# Patient Record
Sex: FEMALE | Race: ASIAN | NOT HISPANIC OR LATINO
[De-identification: names, ages, dates, MRNs, and addresses within clinical notes are randomized per-mention and may not be internally consistent; named-entity substitution may affect disease eponyms.]

---

## 2020-10-27 ENCOUNTER — APPOINTMENT (OUTPATIENT)
Dept: ANTEPARTUM | Facility: CLINIC | Age: 30
End: 2020-10-27
Payer: COMMERCIAL

## 2020-10-27 PROBLEM — Z00.00 ENCOUNTER FOR PREVENTIVE HEALTH EXAMINATION: Status: ACTIVE | Noted: 2020-10-27

## 2020-10-27 PROCEDURE — 76817 TRANSVAGINAL US OBSTETRIC: CPT | Mod: 59

## 2020-10-27 PROCEDURE — 76805 OB US >/= 14 WKS SNGL FETUS: CPT

## 2020-10-27 PROCEDURE — 99072 ADDL SUPL MATRL&STAF TM PHE: CPT

## 2020-11-24 ENCOUNTER — APPOINTMENT (OUTPATIENT)
Dept: ANTEPARTUM | Facility: CLINIC | Age: 30
End: 2020-11-24
Payer: COMMERCIAL

## 2020-11-24 PROCEDURE — 76817 TRANSVAGINAL US OBSTETRIC: CPT | Mod: 59

## 2020-11-24 PROCEDURE — 76805 OB US >/= 14 WKS SNGL FETUS: CPT

## 2021-03-30 ENCOUNTER — ASOB RESULT (OUTPATIENT)
Age: 31
End: 2021-03-30

## 2021-03-30 ENCOUNTER — APPOINTMENT (OUTPATIENT)
Dept: ANTEPARTUM | Facility: CLINIC | Age: 31
End: 2021-03-30
Payer: COMMERCIAL

## 2021-03-30 PROCEDURE — 76819 FETAL BIOPHYS PROFIL W/O NST: CPT

## 2021-03-30 PROCEDURE — 99072 ADDL SUPL MATRL&STAF TM PHE: CPT

## 2021-03-30 PROCEDURE — 76816 OB US FOLLOW-UP PER FETUS: CPT

## 2021-04-04 ENCOUNTER — INPATIENT (INPATIENT)
Facility: HOSPITAL | Age: 31
LOS: 2 days | Discharge: HOME | End: 2021-04-07
Attending: OBSTETRICS & GYNECOLOGY | Admitting: OBSTETRICS & GYNECOLOGY
Payer: COMMERCIAL

## 2021-04-04 ENCOUNTER — TRANSCRIPTION ENCOUNTER (OUTPATIENT)
Age: 31
End: 2021-04-04

## 2021-04-04 DIAGNOSIS — O26.899 OTHER SPECIFIED PREGNANCY RELATED CONDITIONS, UNSPECIFIED TRIMESTER: ICD-10-CM

## 2021-04-04 DIAGNOSIS — Z3A.00 WEEKS OF GESTATION OF PREGNANCY NOT SPECIFIED: ICD-10-CM

## 2021-04-04 LAB
BASOPHILS # BLD AUTO: 0.04 K/UL — SIGNIFICANT CHANGE UP (ref 0–0.2)
BASOPHILS NFR BLD AUTO: 0.7 % — SIGNIFICANT CHANGE UP (ref 0–2)
EOSINOPHIL # BLD AUTO: 0.08 K/UL — SIGNIFICANT CHANGE UP (ref 0–0.5)
EOSINOPHIL NFR BLD AUTO: 1.4 % — SIGNIFICANT CHANGE UP (ref 0–6)
HCT VFR BLD CALC: 37.1 % — SIGNIFICANT CHANGE UP (ref 34.5–45)
HGB BLD-MCNC: 11.9 G/DL — SIGNIFICANT CHANGE UP (ref 11.5–15.5)
IMM GRANULOCYTES NFR BLD AUTO: 0.5 % — SIGNIFICANT CHANGE UP (ref 0–1.5)
LYMPHOCYTES # BLD AUTO: 1.44 K/UL — SIGNIFICANT CHANGE UP (ref 1–3.3)
LYMPHOCYTES # BLD AUTO: 24.3 % — SIGNIFICANT CHANGE UP (ref 13–44)
MCHC RBC-ENTMCNC: 29.4 PG — SIGNIFICANT CHANGE UP (ref 27–34)
MCHC RBC-ENTMCNC: 32.1 GM/DL — SIGNIFICANT CHANGE UP (ref 32–36)
MCV RBC AUTO: 91.6 FL — SIGNIFICANT CHANGE UP (ref 80–100)
MONOCYTES # BLD AUTO: 0.52 K/UL — SIGNIFICANT CHANGE UP (ref 0–0.9)
MONOCYTES NFR BLD AUTO: 8.8 % — SIGNIFICANT CHANGE UP (ref 2–14)
NEUTROPHILS # BLD AUTO: 3.81 K/UL — SIGNIFICANT CHANGE UP (ref 1.8–7.4)
NEUTROPHILS NFR BLD AUTO: 64.3 % — SIGNIFICANT CHANGE UP (ref 43–77)
NRBC # BLD: 0 /100 WBCS — SIGNIFICANT CHANGE UP (ref 0–0)
PLATELET # BLD AUTO: 230 K/UL — SIGNIFICANT CHANGE UP (ref 150–400)
RBC # BLD: 4.05 M/UL — SIGNIFICANT CHANGE UP (ref 3.8–5.2)
RBC # FLD: 13 % — SIGNIFICANT CHANGE UP (ref 10.3–14.5)
WBC # BLD: 5.92 K/UL — SIGNIFICANT CHANGE UP (ref 3.8–10.5)
WBC # FLD AUTO: 5.92 K/UL — SIGNIFICANT CHANGE UP (ref 3.8–10.5)

## 2021-04-04 RX ORDER — OXYTOCIN 10 UNIT/ML
333.33 VIAL (ML) INJECTION
Qty: 20 | Refills: 0 | Status: DISCONTINUED | OUTPATIENT
Start: 2021-04-04 | End: 2021-04-05

## 2021-04-04 RX ORDER — OXYTOCIN 10 UNIT/ML
2 VIAL (ML) INJECTION
Qty: 30 | Refills: 0 | Status: DISCONTINUED | OUTPATIENT
Start: 2021-04-04 | End: 2021-04-05

## 2021-04-04 RX ORDER — SODIUM CHLORIDE 9 MG/ML
1000 INJECTION, SOLUTION INTRAVENOUS
Refills: 0 | Status: DISCONTINUED | OUTPATIENT
Start: 2021-04-04 | End: 2021-04-05

## 2021-04-04 RX ORDER — CITRIC ACID/SODIUM CITRATE 300-500 MG
15 SOLUTION, ORAL ORAL ONCE
Refills: 0 | Status: DISCONTINUED | OUTPATIENT
Start: 2021-04-04 | End: 2021-04-05

## 2021-04-05 ENCOUNTER — RESULT REVIEW (OUTPATIENT)
Age: 31
End: 2021-04-05

## 2021-04-05 VITALS — HEIGHT: 56 IN | WEIGHT: 105.82 LBS

## 2021-04-05 LAB
ALBUMIN SERPL ELPH-MCNC: 2.9 G/DL — LOW (ref 3.3–5)
ALP SERPL-CCNC: 213 U/L — HIGH (ref 40–120)
ALT FLD-CCNC: 7 U/L — LOW (ref 10–45)
ANION GAP SERPL CALC-SCNC: 10 MMOL/L — SIGNIFICANT CHANGE UP (ref 5–17)
APTT BLD: 29.5 SEC — SIGNIFICANT CHANGE UP (ref 27.5–35.5)
AST SERPL-CCNC: 18 U/L — SIGNIFICANT CHANGE UP (ref 10–40)
BILIRUB SERPL-MCNC: 0.4 MG/DL — SIGNIFICANT CHANGE UP (ref 0.2–1.2)
BLD GP AB SCN SERPL QL: NEGATIVE — SIGNIFICANT CHANGE UP
BUN SERPL-MCNC: 8 MG/DL — SIGNIFICANT CHANGE UP (ref 7–23)
CALCIUM SERPL-MCNC: 8.6 MG/DL — SIGNIFICANT CHANGE UP (ref 8.4–10.5)
CHLORIDE SERPL-SCNC: 107 MMOL/L — SIGNIFICANT CHANGE UP (ref 96–108)
CO2 SERPL-SCNC: 23 MMOL/L — SIGNIFICANT CHANGE UP (ref 22–31)
COVID-19 SPIKE DOMAIN AB INTERP: POSITIVE
COVID-19 SPIKE DOMAIN ANTIBODY RESULT: 31.4 U/ML — HIGH
CREAT ?TM UR-MCNC: 39 MG/DL — SIGNIFICANT CHANGE UP
CREAT SERPL-MCNC: 0.73 MG/DL — SIGNIFICANT CHANGE UP (ref 0.5–1.3)
FIBRINOGEN PPP-MCNC: 391 MG/DL — SIGNIFICANT CHANGE UP (ref 258–438)
GLUCOSE SERPL-MCNC: 66 MG/DL — LOW (ref 70–99)
INR BLD: 0.87 — LOW (ref 0.88–1.16)
LDH SERPL L TO P-CCNC: 191 U/L — SIGNIFICANT CHANGE UP (ref 50–242)
POTASSIUM SERPL-MCNC: 4.1 MMOL/L — SIGNIFICANT CHANGE UP (ref 3.5–5.3)
POTASSIUM SERPL-SCNC: 4.1 MMOL/L — SIGNIFICANT CHANGE UP (ref 3.5–5.3)
PROT ?TM UR-MCNC: 9 MG/DL — SIGNIFICANT CHANGE UP (ref 0–12)
PROT SERPL-MCNC: 5.8 G/DL — LOW (ref 6–8.3)
PROT/CREAT UR-RTO: 0.2 RATIO — SIGNIFICANT CHANGE UP (ref 0–0.2)
PROTHROM AB SERPL-ACNC: 10.5 SEC — LOW (ref 10.6–13.6)
RH IG SCN BLD-IMP: POSITIVE — SIGNIFICANT CHANGE UP
RH IG SCN BLD-IMP: POSITIVE — SIGNIFICANT CHANGE UP
SARS-COV-2 IGG+IGM SERPL QL IA: 31.4 U/ML — HIGH
SARS-COV-2 IGG+IGM SERPL QL IA: POSITIVE
SARS-COV-2 RNA SPEC QL NAA+PROBE: SIGNIFICANT CHANGE UP
SODIUM SERPL-SCNC: 140 MMOL/L — SIGNIFICANT CHANGE UP (ref 135–145)
T PALLIDUM AB TITR SER: NEGATIVE — SIGNIFICANT CHANGE UP
URATE SERPL-MCNC: 4.9 MG/DL — SIGNIFICANT CHANGE UP (ref 2.5–7)

## 2021-04-05 PROCEDURE — 88307 TISSUE EXAM BY PATHOLOGIST: CPT | Mod: 26

## 2021-04-05 RX ORDER — ACETAMINOPHEN 500 MG
1000 TABLET ORAL ONCE
Refills: 0 | Status: COMPLETED | OUTPATIENT
Start: 2021-04-05 | End: 2021-04-05

## 2021-04-05 RX ORDER — KETOROLAC TROMETHAMINE 30 MG/ML
30 SYRINGE (ML) INJECTION ONCE
Refills: 0 | Status: DISCONTINUED | OUTPATIENT
Start: 2021-04-05 | End: 2021-04-05

## 2021-04-05 RX ORDER — OXYTOCIN 10 UNIT/ML
41.67 VIAL (ML) INJECTION
Qty: 20 | Refills: 0 | Status: DISCONTINUED | OUTPATIENT
Start: 2021-04-05 | End: 2021-04-07

## 2021-04-05 RX ORDER — DIPHENHYDRAMINE HCL 50 MG
25 CAPSULE ORAL EVERY 6 HOURS
Refills: 0 | Status: DISCONTINUED | OUTPATIENT
Start: 2021-04-05 | End: 2021-04-07

## 2021-04-05 RX ORDER — OXYTOCIN 10 UNIT/ML
333.33 VIAL (ML) INJECTION
Qty: 20 | Refills: 0 | Status: DISCONTINUED | OUTPATIENT
Start: 2021-04-05 | End: 2021-04-07

## 2021-04-05 RX ORDER — CARBOPROST TROMETHAMINE 250 UG/ML
250 INJECTION, SOLUTION INTRAMUSCULAR ONCE
Refills: 0 | Status: COMPLETED | OUTPATIENT
Start: 2021-04-05 | End: 2021-04-05

## 2021-04-05 RX ORDER — ACETAMINOPHEN 500 MG
975 TABLET ORAL
Refills: 0 | Status: DISCONTINUED | OUTPATIENT
Start: 2021-04-05 | End: 2021-04-07

## 2021-04-05 RX ORDER — DIBUCAINE 1 %
1 OINTMENT (GRAM) RECTAL EVERY 6 HOURS
Refills: 0 | Status: DISCONTINUED | OUTPATIENT
Start: 2021-04-05 | End: 2021-04-07

## 2021-04-05 RX ORDER — HYDROCORTISONE 1 %
1 OINTMENT (GRAM) TOPICAL EVERY 6 HOURS
Refills: 0 | Status: DISCONTINUED | OUTPATIENT
Start: 2021-04-05 | End: 2021-04-07

## 2021-04-05 RX ORDER — AMPICILLIN TRIHYDRATE 250 MG
2 CAPSULE ORAL EVERY 6 HOURS
Refills: 0 | Status: DISCONTINUED | OUTPATIENT
Start: 2021-04-05 | End: 2021-04-06

## 2021-04-05 RX ORDER — SODIUM CHLORIDE 9 MG/ML
3 INJECTION INTRAMUSCULAR; INTRAVENOUS; SUBCUTANEOUS EVERY 8 HOURS
Refills: 0 | Status: DISCONTINUED | OUTPATIENT
Start: 2021-04-05 | End: 2021-04-07

## 2021-04-05 RX ORDER — IBUPROFEN 200 MG
600 TABLET ORAL EVERY 6 HOURS
Refills: 0 | Status: COMPLETED | OUTPATIENT
Start: 2021-04-05 | End: 2022-03-04

## 2021-04-05 RX ORDER — GENTAMICIN SULFATE 40 MG/ML
240 VIAL (ML) INJECTION ONCE
Refills: 0 | Status: COMPLETED | OUTPATIENT
Start: 2021-04-05 | End: 2021-04-05

## 2021-04-05 RX ORDER — LANOLIN
1 OINTMENT (GRAM) TOPICAL EVERY 6 HOURS
Refills: 0 | Status: DISCONTINUED | OUTPATIENT
Start: 2021-04-05 | End: 2021-04-07

## 2021-04-05 RX ORDER — PRAMOXINE HYDROCHLORIDE 150 MG/15G
1 AEROSOL, FOAM RECTAL EVERY 4 HOURS
Refills: 0 | Status: DISCONTINUED | OUTPATIENT
Start: 2021-04-05 | End: 2021-04-07

## 2021-04-05 RX ORDER — FENTANYL/BUPIVACAINE/NS/PF 2MCG/ML-.1
250 PLASTIC BAG, INJECTION (ML) INJECTION
Refills: 0 | Status: DISCONTINUED | OUTPATIENT
Start: 2021-04-05 | End: 2021-04-05

## 2021-04-05 RX ORDER — IBUPROFEN 200 MG
600 TABLET ORAL EVERY 6 HOURS
Refills: 0 | Status: DISCONTINUED | OUTPATIENT
Start: 2021-04-05 | End: 2021-04-07

## 2021-04-05 RX ORDER — SIMETHICONE 80 MG/1
80 TABLET, CHEWABLE ORAL EVERY 4 HOURS
Refills: 0 | Status: DISCONTINUED | OUTPATIENT
Start: 2021-04-05 | End: 2021-04-07

## 2021-04-05 RX ORDER — MAGNESIUM HYDROXIDE 400 MG/1
30 TABLET, CHEWABLE ORAL
Refills: 0 | Status: DISCONTINUED | OUTPATIENT
Start: 2021-04-05 | End: 2021-04-07

## 2021-04-05 RX ORDER — TETANUS TOXOID, REDUCED DIPHTHERIA TOXOID AND ACELLULAR PERTUSSIS VACCINE, ADSORBED 5; 2.5; 8; 8; 2.5 [IU]/.5ML; [IU]/.5ML; UG/.5ML; UG/.5ML; UG/.5ML
0.5 SUSPENSION INTRAMUSCULAR ONCE
Refills: 0 | Status: DISCONTINUED | OUTPATIENT
Start: 2021-04-05 | End: 2021-04-07

## 2021-04-05 RX ORDER — BENZOCAINE 10 %
1 GEL (GRAM) MUCOUS MEMBRANE EVERY 6 HOURS
Refills: 0 | Status: DISCONTINUED | OUTPATIENT
Start: 2021-04-05 | End: 2021-04-07

## 2021-04-05 RX ORDER — AER TRAVELER 0.5 G/1
1 SOLUTION RECTAL; TOPICAL EVERY 4 HOURS
Refills: 0 | Status: DISCONTINUED | OUTPATIENT
Start: 2021-04-05 | End: 2021-04-07

## 2021-04-05 RX ORDER — OXYCODONE HYDROCHLORIDE 5 MG/1
5 TABLET ORAL ONCE
Refills: 0 | Status: DISCONTINUED | OUTPATIENT
Start: 2021-04-05 | End: 2021-04-05

## 2021-04-05 RX ADMIN — Medication 1000 MILLIGRAM(S): at 10:07

## 2021-04-05 RX ADMIN — Medication 212 MILLIGRAM(S): at 08:35

## 2021-04-05 RX ADMIN — Medication 1 SPRAY(S): at 22:45

## 2021-04-05 RX ADMIN — Medication 1 APPLICATION(S): at 22:45

## 2021-04-05 RX ADMIN — SODIUM CHLORIDE 3 MILLILITER(S): 9 INJECTION INTRAMUSCULAR; INTRAVENOUS; SUBCUTANEOUS at 15:37

## 2021-04-05 RX ADMIN — Medication 216 GRAM(S): at 07:47

## 2021-04-05 RX ADMIN — Medication 975 MILLIGRAM(S): at 16:26

## 2021-04-05 RX ADMIN — Medication 216 GRAM(S): at 22:11

## 2021-04-05 RX ADMIN — Medication 30 MILLIGRAM(S): at 09:02

## 2021-04-05 RX ADMIN — Medication 600 MILLIGRAM(S): at 18:23

## 2021-04-05 RX ADMIN — Medication 400 MILLIGRAM(S): at 08:14

## 2021-04-05 RX ADMIN — Medication 975 MILLIGRAM(S): at 22:10

## 2021-04-05 RX ADMIN — Medication 975 MILLIGRAM(S): at 22:40

## 2021-04-05 RX ADMIN — OXYCODONE HYDROCHLORIDE 5 MILLIGRAM(S): 5 TABLET ORAL at 11:33

## 2021-04-05 RX ADMIN — OXYCODONE HYDROCHLORIDE 5 MILLIGRAM(S): 5 TABLET ORAL at 15:35

## 2021-04-05 RX ADMIN — Medication 216 GRAM(S): at 15:35

## 2021-04-05 RX ADMIN — Medication 600 MILLIGRAM(S): at 18:24

## 2021-04-05 RX ADMIN — Medication 30 MILLIGRAM(S): at 10:07

## 2021-04-05 RX ADMIN — Medication 1000 MILLIUNIT(S)/MIN: at 12:55

## 2021-04-05 RX ADMIN — Medication 2 MILLIUNIT(S)/MIN: at 01:20

## 2021-04-05 RX ADMIN — CARBOPROST TROMETHAMINE 250 MICROGRAM(S): 250 INJECTION, SOLUTION INTRAMUSCULAR at 08:54

## 2021-04-05 RX ADMIN — Medication 1000 MILLIUNIT(S)/MIN: at 08:56

## 2021-04-05 RX ADMIN — Medication 250 MILLILITER(S): at 01:00

## 2021-04-05 NOTE — LACTATION INITIAL EVALUATION - NS LACT CON REASON FOR REQ
Full term baby girl, seen around 10 hrs of life. Baby s/p brief nicu stay due to maternal temp, now back in mothers pp room. Baby has not et , has taken small amounts of formula each feed, still dtv and dtm. Baby noted to have visible lingual frenulum, and implications of this discussed with parents and they may speak with peds for further guidance. Complete breastfeeding education was provided to primip mom and she verbalized understanding of info given. Manual expression technique was taught to the mother with proper return demo. The benefits and rationale for practicing hand expression were discussed. Colostrum easily expressible. 1/2 tsp collected and slowly givent o baby by myself and the mother. Placed the baby skin to skin with the mother and assisted with positioning in a cradle hold. Latching strategies and the importance of acquiring a deep latch were taught. Baby able to gape wide and deeply attached, feeding with an organized, nutritive suck for 10+min thus far. Mother confirms a strong pull of the nipple and denies discomfort. Responsive/ frequent feeds, continued/ increased SSC and rooming-in were encouraged. All questions were answered, mother verbalized understanding of teaching and info given./primaparous mom

## 2021-04-06 RX ADMIN — Medication 600 MILLIGRAM(S): at 19:25

## 2021-04-06 RX ADMIN — Medication 600 MILLIGRAM(S): at 00:21

## 2021-04-06 RX ADMIN — Medication 600 MILLIGRAM(S): at 06:07

## 2021-04-06 RX ADMIN — Medication 975 MILLIGRAM(S): at 16:03

## 2021-04-06 RX ADMIN — Medication 975 MILLIGRAM(S): at 21:52

## 2021-04-06 RX ADMIN — Medication 975 MILLIGRAM(S): at 09:24

## 2021-04-06 RX ADMIN — Medication 1 TABLET(S): at 12:55

## 2021-04-06 RX ADMIN — Medication 600 MILLIGRAM(S): at 13:38

## 2021-04-06 RX ADMIN — Medication 975 MILLIGRAM(S): at 17:18

## 2021-04-06 RX ADMIN — Medication 600 MILLIGRAM(S): at 18:45

## 2021-04-06 RX ADMIN — Medication 216 GRAM(S): at 03:44

## 2021-04-06 RX ADMIN — Medication 600 MILLIGRAM(S): at 00:50

## 2021-04-06 RX ADMIN — Medication 975 MILLIGRAM(S): at 10:00

## 2021-04-06 RX ADMIN — Medication 975 MILLIGRAM(S): at 22:00

## 2021-04-06 RX ADMIN — Medication 600 MILLIGRAM(S): at 12:53

## 2021-04-06 RX ADMIN — Medication 975 MILLIGRAM(S): at 03:44

## 2021-04-06 NOTE — PROGRESS NOTE ADULT - ASSESSMENT
A/P yo 30y  s/p FAVD, PP#1 , stable, meeting postpartum milestones  - gHTN: normotensive overnight, will continue to monitor  - Intrapartum fever: s/p IV A/G, afebrile overnight  - Pain: controlled on tylenol and motrin  - GI: Tolerating regular diet  - :  Voiding spontaneously without pain or difficulty  - DVT prophylaxis: ambulating well, no SCDs needed at this time   - Dispo: PP#1/2 unless otherwise specified  A/P yo 30y  s/p FAVD, PP#1 , stable, meeting postpartum milestones  - gHTN: normotensive overnight, will continue to monitor  - Intrapartum fever: s/p IV Gent, on IV ampicillin until 24hrs afebrile, afebrile overnight  - Pain: controlled on tylenol and motrin  - GI: Tolerating regular diet  - :  Voiding spontaneously without pain or difficulty  - DVT prophylaxis: ambulating well, no SCDs needed at this time   - Dispo: PP#1/2 unless otherwise specified  A/P yo 30y  s/p FAVD, PP#1 , stable, meeting postpartum milestones  - gHTN: normotensive overnight, will continue to monitor  - Intrapartum fever: s/p IV amp/gent.  Afebrile overnight  - Pain: controlled on tylenol and motrin  - GI: Tolerating regular diet  - :  Voiding spontaneously without pain or difficulty  - DVT prophylaxis: ambulating well, no SCDs needed at this time   - Dispo: PP#1/2 unless otherwise specified

## 2021-04-07 ENCOUNTER — TRANSCRIPTION ENCOUNTER (OUTPATIENT)
Age: 31
End: 2021-04-07

## 2021-04-07 VITALS
OXYGEN SATURATION: 97 % | HEART RATE: 67 BPM | TEMPERATURE: 98 F | DIASTOLIC BLOOD PRESSURE: 82 MMHG | SYSTOLIC BLOOD PRESSURE: 121 MMHG | RESPIRATION RATE: 18 BRPM

## 2021-04-07 LAB — SURGICAL PATHOLOGY STUDY: SIGNIFICANT CHANGE UP

## 2021-04-07 RX ORDER — IBUPROFEN 200 MG
1 TABLET ORAL
Qty: 0 | Refills: 0 | DISCHARGE
Start: 2021-04-07

## 2021-04-07 RX ORDER — ACETAMINOPHEN 500 MG
3 TABLET ORAL
Qty: 0 | Refills: 0 | DISCHARGE
Start: 2021-04-07

## 2021-04-07 RX ORDER — POLYETHYLENE GLYCOL 3350 17 G/17G
17 POWDER, FOR SOLUTION ORAL EVERY 12 HOURS
Refills: 0 | Status: DISCONTINUED | OUTPATIENT
Start: 2021-04-07 | End: 2021-04-07

## 2021-04-07 RX ADMIN — Medication 600 MILLIGRAM(S): at 06:43

## 2021-04-07 RX ADMIN — Medication 975 MILLIGRAM(S): at 03:30

## 2021-04-07 RX ADMIN — Medication 1 TABLET(S): at 12:46

## 2021-04-07 RX ADMIN — Medication 975 MILLIGRAM(S): at 10:07

## 2021-04-07 RX ADMIN — Medication 975 MILLIGRAM(S): at 09:39

## 2021-04-07 RX ADMIN — Medication 600 MILLIGRAM(S): at 00:23

## 2021-04-07 RX ADMIN — Medication 600 MILLIGRAM(S): at 01:15

## 2021-04-07 RX ADMIN — Medication 600 MILLIGRAM(S): at 13:20

## 2021-04-07 RX ADMIN — Medication 975 MILLIGRAM(S): at 02:29

## 2021-04-07 RX ADMIN — Medication 600 MILLIGRAM(S): at 12:47

## 2021-04-07 RX ADMIN — SODIUM CHLORIDE 3 MILLILITER(S): 9 INJECTION INTRAMUSCULAR; INTRAVENOUS; SUBCUTANEOUS at 00:24

## 2021-04-07 RX ADMIN — Medication 600 MILLIGRAM(S): at 07:36

## 2021-04-07 NOTE — PROGRESS NOTE ADULT - SUBJECTIVE AND OBJECTIVE BOX
Patient evaluated at bedside.  No acute events overnight.    She reports pain is well controlled with medications.     She has been ambulating without assistance and is voiding spontaneously. Reports decrease in vaginal bleeding and denies clots.     She denies HA, dizziness, chest pain, palpitations, shortness of breath, n/v, heavy vaginal bleeding or perineal discomfort.    Physical Exam:  Vital Signs Last 24 Hrs  T(C): 36.9 (07 Apr 2021 02:00), Max: 36.9 (07 Apr 2021 02:00)  T(F): 98.4 (07 Apr 2021 02:00), Max: 98.4 (07 Apr 2021 02:00)  HR: 83 (07 Apr 2021 02:00) (74 - 83)  BP: 119/84 (07 Apr 2021 02:00) (108/74 - 128/86)  BP(mean): --  RR: 17 (07 Apr 2021 02:00) (17 - 18)  SpO2: 97% (07 Apr 2021 02:00) (97% - 100%)    GA: NAD, A+0 x 3  Abd: + BS, soft, appropriately tender, nondistended, no rebound or guarding, uterus firm at midline  : lochia WNL  Extremities: no edema or calf tenderness      04-05    140  |  107  |  8   ----------------------------<  66<L>  4.1   |  23  |  0.73    Ca    8.6      05 Apr 2021 06:48    TPro  5.8<L>  /  Alb  2.9<L>  /  TBili  0.4  /  DBili  x   /  AST  18  /  ALT  7<L>  /  AlkPhos  213<H>  04-05    PT/INR - ( 05 Apr 2021 06:48 )   PT: 10.5 sec;   INR: 0.87          PTT - ( 05 Apr 2021 06:48 )  PTT:29.5 sec    
Patient evaluated at bedside.  No acute events overnight.    She reports pain is well controlled with medications.     She has been ambulating without assistance and is voiding spontaneously. Reports decrease in vaginal bleeding and denies clots.     She denies HA, dizziness, chest pain, palpitations, shortness of breath, n/v, heavy vaginal bleeding or perineal discomfort.    Physical Exam:  Vital Signs Last 24 Hrs  T(C): 36.3 (05 Apr 2021 22:00), Max: 36.8 (05 Apr 2021 15:54)  T(F): 97.3 (05 Apr 2021 22:00), Max: 98.2 (05 Apr 2021 15:54)  HR: 65 (05 Apr 2021 22:00) (64 - 111)  BP: 117/76 (05 Apr 2021 22:00) (104/82 - 132/85)  BP(mean): --  RR: 17 (05 Apr 2021 22:00) (16 - 17)  SpO2: 100% (05 Apr 2021 22:00) (97% - 100%)    GA: NAD, A+0 x 3  Abd: + BS, soft, appropriately tender, nondistended, no rebound or guarding, uterus firm at midline  : lochia WNL  Extremities: no edema or calf tenderness                          11.9   5.92  )-----------( 230      ( 04 Apr 2021 23:20 )             37.1     04-05    140  |  107  |  8   ----------------------------<  66<L>  4.1   |  23  |  0.73    Ca    8.6      05 Apr 2021 06:48    TPro  5.8<L>  /  Alb  2.9<L>  /  TBili  0.4  /  DBili  x   /  AST  18  /  ALT  7<L>  /  AlkPhos  213<H>  04-05    PT/INR - ( 05 Apr 2021 06:48 )   PT: 10.5 sec;   INR: 0.87          PTT - ( 05 Apr 2021 06:48 )  PTT:29.5 sec

## 2021-04-07 NOTE — DISCHARGE NOTE OB - MEDICATION SUMMARY - MEDICATIONS TO TAKE
I will START or STAY ON the medications listed below when I get home from the hospital:    acetaminophen 325 mg oral tablet  -- 3 tab(s) by mouth   -- Indication: For Postpartum pain    ibuprofen 600 mg oral tablet  -- 1 tab(s) by mouth every 6 hours  -- Indication: For Postpartum pain     Prenatal Multivitamins with Folic Acid 1 mg oral tablet  -- 1 tab(s) by mouth once a day  -- Indication: For Postpartum state

## 2021-04-07 NOTE — DISCHARGE NOTE OB - PATIENT PORTAL LINK FT
You can access the FollowMyHealth Patient Portal offered by [facility] by registering at the following website: [email or web address]. By joining [facility]’s FollowMyHealth portal, you will also be able to view your health information using other applications (apps) compatible with our system.

## 2021-04-07 NOTE — DISCHARGE NOTE OB - CARE PLAN
Principal Discharge DX:	Postpartum state  Goal:	Healthy recovery  Assessment and plan of treatment:	- Take Motrin 600mg every 6 hours and/or Tylenol 650mg every 6 hours as needed for pain.   - Call your Doctor  to schedule a follow up appointment for 6 weeks after delivery.   - Call your Doctor if you experience severe abdominal pain not improved by oral pain medications, heavy bright red vaginal bleeding saturating more than 1 pad per hour, or fever greater than 100.4F.   - Nothing in the vagina for 6 weeks ( no intercourse or tampons.)  - Shower only for 6 weeks - no baths or swimming.   Principal Discharge DX:	Postpartum state  Goal:	Healthy recovery  Assessment and plan of treatment:	- Take Motrin 600mg every 6 hours and/or Tylenol 650mg every 6 hours as needed for pain.   - Call your Doctor  to schedule a follow up appointment for 6 weeks after delivery.   - Call your Doctor if you experience severe abdominal pain not improved by oral pain medications, heavy bright red vaginal bleeding saturating more than 1 pad per hour, or fever greater than 100.4F.   - Nothing in the vagina for 6 weeks ( no intercourse or tampons.)  - Shower only for 6 weeks - no baths or swimming.  Secondary Diagnosis:	Gestational hypertension  Goal:	Blood pressure control  Assessment and plan of treatment:	Take your blood pressure three times a day and write them in a log so that you can give them to your doctor.     If you are taking blood pressure medications, do not take your blood pressure medications if the top number is less than 110 or if the bottom number is less than 60. If you notice these low blood pressures, please call your doctor.   If your blood pressure is greater than 160/110, call your doctor as soon as you can.   If you have a severe headache, blurry vision, chest pain, shortness of breath, severe abdominal pain or body swelling, please call your doctor or come to the emergency room.  Call your OBGYN's office to schedule a follow up appointment.

## 2021-04-07 NOTE — DISCHARGE NOTE OB - HOSPITAL COURSE
Patient is status post a forceps assisted vaginal delivery. She had an uncomplicated postpartum course and has met her postpartum milestones appropriately.  Vitals are stable for discharge.  Patient is status post a forceps assisted vaginal delivery. She met criteria for gestational hypertension but she has been normotensive since delivery. She had an uncomplicated postpartum course and has met her postpartum milestones appropriately.  Vitals are stable for discharge.

## 2021-04-07 NOTE — DISCHARGE NOTE OB - CARE PROVIDER_API CALL
LUCINA CHRISTOPHER  Obstetrics and Gynecology  [address] [place] [address], [address] [place] [address]  Phone: [phone]  Fax: [phone]  Follow Up Time:

## 2021-04-07 NOTE — PROGRESS NOTE ADULT - ASSESSMENT
A/P yo 30y  s/p FAVD, PP#2 , stable, meeting postpartum milestones  - gHTN: normotensive overnight, will continue to monitor  - Intrapartum fever: s/p IV amp/gent.  Afebrile since  - Pain: controlled on tylenol and motrin  - GI: Tolerating regular diet  - :  Voiding spontaneously without pain or difficulty  - DVT prophylaxis: ambulating well, no SCDs needed at this time   - Dispo: PP#2 unless otherwise specified  A/P yo 30y  s/p FAVROOPA, PP#2 , stable, meeting postpartum milestones  - gHTN: normotensive overnight, will continue to monitor  - Intrapartum fever: s/p IV amp/gent.  Afebrile since  - Pain: controlled on tylenol and motrin  - GI: Tolerating regular diet  - :  Voiding spontaneously without pain or difficulty  - DVT prophylaxis: ambulating well, no SCDs needed at this time   - Dispo: PP#2 unless otherwise specified     Patient seen and examined, agree with assessment and plan.  PPD2 s/p FAD, doing well, DC home today.    Dr. Newman, Attending

## 2021-04-07 NOTE — DISCHARGE NOTE OB - PLAN OF CARE
- Take Motrin 600mg every 6 hours and/or Tylenol 650mg every 6 hours as needed for pain.   - Call your Doctor  to schedule a follow up appointment for 6 weeks after delivery.   - Call your Doctor if you experience severe abdominal pain not improved by oral pain medications, heavy bright red vaginal bleeding saturating more than 1 pad per hour, or fever greater than 100.4F.   - Nothing in the vagina for 6 weeks ( no intercourse or tampons.)  - Shower only for 6 weeks - no baths or swimming. Healthy recovery Blood pressure control Take your blood pressure three times a day and write them in a log so that you can give them to your doctor.     If you are taking blood pressure medications, do not take your blood pressure medications if the top number is less than 110 or if the bottom number is less than 60. If you notice these low blood pressures, please call your doctor.   If your blood pressure is greater than 160/110, call your doctor as soon as you can.   If you have a severe headache, blurry vision, chest pain, shortness of breath, severe abdominal pain or body swelling, please call your doctor or come to the emergency room.  Call your OBGYN's office to schedule a follow up appointment.

## 2021-04-12 DIAGNOSIS — O41.1230 CHORIOAMNIONITIS, THIRD TRIMESTER, NOT APPLICABLE OR UNSPECIFIED: ICD-10-CM

## 2021-04-12 DIAGNOSIS — Z3A.40 40 WEEKS GESTATION OF PREGNANCY: ICD-10-CM

## 2021-04-12 DIAGNOSIS — Z34.83 ENCOUNTER FOR SUPERVISION OF OTHER NORMAL PREGNANCY, THIRD TRIMESTER: ICD-10-CM
